# Patient Record
Sex: FEMALE | Race: WHITE | NOT HISPANIC OR LATINO | Employment: OTHER | URBAN - NONMETROPOLITAN AREA
[De-identification: names, ages, dates, MRNs, and addresses within clinical notes are randomized per-mention and may not be internally consistent; named-entity substitution may affect disease eponyms.]

---

## 2021-02-05 ENCOUNTER — HOSPITAL ENCOUNTER (OUTPATIENT)
Facility: HOSPITAL | Age: 76
Setting detail: OBSERVATION
Discharge: HOME | End: 2021-02-07
Attending: EMERGENCY MEDICINE | Admitting: HOSPITALIST

## 2021-02-05 ENCOUNTER — APPOINTMENT (OUTPATIENT)
Dept: CT IMAGING | Facility: HOSPITAL | Age: 76
End: 2021-02-05

## 2021-02-05 ENCOUNTER — APPOINTMENT (OUTPATIENT)
Dept: GENERAL RADIOLOGY | Facility: HOSPITAL | Age: 76
End: 2021-02-05

## 2021-02-05 DIAGNOSIS — R42 VERTIGO: Primary | ICD-10-CM

## 2021-02-05 LAB
ALBUMIN SERPL-MCNC: 3.9 G/DL (ref 3.5–5.2)
ALBUMIN/GLOB SERPL: 1.3 G/DL
ALP SERPL-CCNC: 70 U/L (ref 39–117)
ALT SERPL W P-5'-P-CCNC: 10 U/L (ref 1–33)
ANION GAP SERPL CALCULATED.3IONS-SCNC: 13 MMOL/L (ref 5–15)
APTT PPP: 24.4 SECONDS (ref 20–40.3)
AST SERPL-CCNC: 14 U/L (ref 1–32)
BASOPHILS # BLD AUTO: 0.1 10*3/MM3 (ref 0–0.2)
BASOPHILS NFR BLD AUTO: 0.9 % (ref 0–1.5)
BILIRUB SERPL-MCNC: 0.6 MG/DL (ref 0–1.2)
BUN SERPL-MCNC: 12 MG/DL (ref 8–23)
BUN/CREAT SERPL: 13.8 (ref 7–25)
CALCIUM SPEC-SCNC: 9.4 MG/DL (ref 8.6–10.5)
CHLORIDE SERPL-SCNC: 98 MMOL/L (ref 98–107)
CO2 SERPL-SCNC: 23 MMOL/L (ref 22–29)
CREAT SERPL-MCNC: 0.87 MG/DL (ref 0.57–1)
DEPRECATED RDW RBC AUTO: 42.5 FL (ref 37–54)
EOSINOPHIL # BLD AUTO: 0.16 10*3/MM3 (ref 0–0.4)
EOSINOPHIL NFR BLD AUTO: 1.5 % (ref 0.3–6.2)
ERYTHROCYTE [DISTWIDTH] IN BLOOD BY AUTOMATED COUNT: 13.8 % (ref 12.3–15.4)
FLUAV RNA RESP QL NAA+PROBE: NOT DETECTED
FLUBV RNA RESP QL NAA+PROBE: NOT DETECTED
GFR SERPL CREATININE-BSD FRML MDRD: 63 ML/MIN/1.73
GLOBULIN UR ELPH-MCNC: 3.1 GM/DL
GLUCOSE SERPL-MCNC: 136 MG/DL (ref 65–99)
HCT VFR BLD AUTO: 38.4 % (ref 34–46.6)
HGB BLD-MCNC: 13.8 G/DL (ref 12–15.9)
HOLD SPECIMEN: NORMAL
HOLD SPECIMEN: NORMAL
IMM GRANULOCYTES # BLD AUTO: 0.03 10*3/MM3 (ref 0–0.05)
IMM GRANULOCYTES NFR BLD AUTO: 0.3 % (ref 0–0.5)
INR PPP: 0.92 (ref 0.8–1.2)
LYMPHOCYTES # BLD AUTO: 4.4 10*3/MM3 (ref 0.7–3.1)
LYMPHOCYTES NFR BLD AUTO: 40.2 % (ref 19.6–45.3)
MAGNESIUM SERPL-MCNC: 2 MG/DL (ref 1.6–2.4)
MCH RBC QN AUTO: 30.8 PG (ref 26.6–33)
MCHC RBC AUTO-ENTMCNC: 35.9 G/DL (ref 31.5–35.7)
MCV RBC AUTO: 85.7 FL (ref 79–97)
MONOCYTES # BLD AUTO: 0.73 10*3/MM3 (ref 0.1–0.9)
MONOCYTES NFR BLD AUTO: 6.7 % (ref 5–12)
NEUTROPHILS NFR BLD AUTO: 5.53 10*3/MM3 (ref 1.7–7)
NEUTROPHILS NFR BLD AUTO: 50.4 % (ref 42.7–76)
NRBC BLD AUTO-RTO: 0 /100 WBC (ref 0–0.2)
PLATELET # BLD AUTO: 234 10*3/MM3 (ref 140–450)
PMV BLD AUTO: 10.5 FL (ref 6–12)
POTASSIUM SERPL-SCNC: 3.1 MMOL/L (ref 3.5–5.2)
PROT SERPL-MCNC: 7 G/DL (ref 6–8.5)
PROTHROMBIN TIME: 12.7 SECONDS (ref 11.1–15.3)
RBC # BLD AUTO: 4.48 10*6/MM3 (ref 3.77–5.28)
SARS-COV-2 RNA RESP QL NAA+PROBE: NOT DETECTED
SODIUM SERPL-SCNC: 134 MMOL/L (ref 136–145)
TROPONIN T SERPL-MCNC: <0.01 NG/ML (ref 0–0.03)
TROPONIN T SERPL-MCNC: <0.01 NG/ML (ref 0–0.03)
WBC # BLD AUTO: 10.95 10*3/MM3 (ref 3.4–10.8)
WHOLE BLOOD HOLD SPECIMEN: NORMAL
WHOLE BLOOD HOLD SPECIMEN: NORMAL

## 2021-02-05 PROCEDURE — 96361 HYDRATE IV INFUSION ADD-ON: CPT

## 2021-02-05 PROCEDURE — 25010000002 ONDANSETRON PER 1 MG: Performed by: EMERGENCY MEDICINE

## 2021-02-05 PROCEDURE — 70498 CT ANGIOGRAPHY NECK: CPT

## 2021-02-05 PROCEDURE — 85730 THROMBOPLASTIN TIME PARTIAL: CPT | Performed by: EMERGENCY MEDICINE

## 2021-02-05 PROCEDURE — 25010000002 LORAZEPAM PER 2 MG: Performed by: EMERGENCY MEDICINE

## 2021-02-05 PROCEDURE — 85610 PROTHROMBIN TIME: CPT | Performed by: EMERGENCY MEDICINE

## 2021-02-05 PROCEDURE — G0378 HOSPITAL OBSERVATION PER HR: HCPCS

## 2021-02-05 PROCEDURE — 80053 COMPREHEN METABOLIC PANEL: CPT | Performed by: EMERGENCY MEDICINE

## 2021-02-05 PROCEDURE — 93010 ELECTROCARDIOGRAM REPORT: CPT | Performed by: INTERNAL MEDICINE

## 2021-02-05 PROCEDURE — 96375 TX/PRO/DX INJ NEW DRUG ADDON: CPT

## 2021-02-05 PROCEDURE — 70496 CT ANGIOGRAPHY HEAD: CPT

## 2021-02-05 PROCEDURE — 99285 EMERGENCY DEPT VISIT HI MDM: CPT

## 2021-02-05 PROCEDURE — 71045 X-RAY EXAM CHEST 1 VIEW: CPT

## 2021-02-05 PROCEDURE — 85025 COMPLETE CBC W/AUTO DIFF WBC: CPT

## 2021-02-05 PROCEDURE — 96374 THER/PROPH/DIAG INJ IV PUSH: CPT

## 2021-02-05 PROCEDURE — C9803 HOPD COVID-19 SPEC COLLECT: HCPCS

## 2021-02-05 PROCEDURE — 0 IOPAMIDOL PER 1 ML: Performed by: EMERGENCY MEDICINE

## 2021-02-05 PROCEDURE — 93005 ELECTROCARDIOGRAM TRACING: CPT | Performed by: EMERGENCY MEDICINE

## 2021-02-05 PROCEDURE — 83735 ASSAY OF MAGNESIUM: CPT | Performed by: EMERGENCY MEDICINE

## 2021-02-05 PROCEDURE — 84484 ASSAY OF TROPONIN QUANT: CPT | Performed by: EMERGENCY MEDICINE

## 2021-02-05 PROCEDURE — 87636 SARSCOV2 & INF A&B AMP PRB: CPT | Performed by: EMERGENCY MEDICINE

## 2021-02-05 PROCEDURE — 70450 CT HEAD/BRAIN W/O DYE: CPT

## 2021-02-05 RX ORDER — ASPIRIN 325 MG
325 TABLET, DELAYED RELEASE (ENTERIC COATED) ORAL ONCE
Status: COMPLETED | OUTPATIENT
Start: 2021-02-05 | End: 2021-02-05

## 2021-02-05 RX ORDER — LORAZEPAM 2 MG/ML
0.5 INJECTION INTRAMUSCULAR ONCE
Status: COMPLETED | OUTPATIENT
Start: 2021-02-05 | End: 2021-02-05

## 2021-02-05 RX ORDER — SODIUM CHLORIDE 9 MG/ML
125 INJECTION, SOLUTION INTRAVENOUS CONTINUOUS
Status: DISCONTINUED | OUTPATIENT
Start: 2021-02-05 | End: 2021-02-06

## 2021-02-05 RX ORDER — SODIUM CHLORIDE 0.9 % (FLUSH) 0.9 %
10 SYRINGE (ML) INJECTION AS NEEDED
Status: DISCONTINUED | OUTPATIENT
Start: 2021-02-05 | End: 2021-02-07 | Stop reason: HOSPADM

## 2021-02-05 RX ORDER — POTASSIUM CHLORIDE 750 MG/1
40 CAPSULE, EXTENDED RELEASE ORAL ONCE
Status: COMPLETED | OUTPATIENT
Start: 2021-02-05 | End: 2021-02-05

## 2021-02-05 RX ORDER — ONDANSETRON 2 MG/ML
4 INJECTION INTRAMUSCULAR; INTRAVENOUS ONCE
Status: COMPLETED | OUTPATIENT
Start: 2021-02-05 | End: 2021-02-05

## 2021-02-05 RX ORDER — MECLIZINE HYDROCHLORIDE 25 MG/1
25 TABLET ORAL ONCE
Status: COMPLETED | OUTPATIENT
Start: 2021-02-05 | End: 2021-02-05

## 2021-02-05 RX ADMIN — ONDANSETRON 4 MG: 2 INJECTION INTRAMUSCULAR; INTRAVENOUS at 18:06

## 2021-02-05 RX ADMIN — LORAZEPAM 0.5 MG: 2 INJECTION INTRAMUSCULAR; INTRAVENOUS at 18:06

## 2021-02-05 RX ADMIN — MECLIZINE HYDROCHLORIDE 25 MG: 25 TABLET ORAL at 22:58

## 2021-02-05 RX ADMIN — SODIUM CHLORIDE 250 ML: 9 INJECTION, SOLUTION INTRAVENOUS at 18:06

## 2021-02-05 RX ADMIN — IOPAMIDOL 90 ML: 755 INJECTION, SOLUTION INTRAVENOUS at 18:37

## 2021-02-05 RX ADMIN — SODIUM CHLORIDE 125 ML/HR: 900 INJECTION, SOLUTION INTRAVENOUS at 18:07

## 2021-02-05 RX ADMIN — ASPIRIN 325 MG: 325 TABLET, COATED ORAL at 22:58

## 2021-02-05 RX ADMIN — POTASSIUM CHLORIDE 40 MEQ: 10 CAPSULE, COATED, EXTENDED RELEASE ORAL at 22:58

## 2021-02-06 ENCOUNTER — APPOINTMENT (OUTPATIENT)
Dept: MRI IMAGING | Facility: HOSPITAL | Age: 76
End: 2021-02-06

## 2021-02-06 LAB
ANION GAP SERPL CALCULATED.3IONS-SCNC: 7 MMOL/L (ref 5–15)
BASOPHILS # BLD AUTO: 0.05 10*3/MM3 (ref 0–0.2)
BASOPHILS NFR BLD AUTO: 0.6 % (ref 0–1.5)
BUN SERPL-MCNC: 9 MG/DL (ref 8–23)
BUN/CREAT SERPL: 10.2 (ref 7–25)
CALCIUM SPEC-SCNC: 8.9 MG/DL (ref 8.6–10.5)
CHLORIDE SERPL-SCNC: 102 MMOL/L (ref 98–107)
CO2 SERPL-SCNC: 29 MMOL/L (ref 22–29)
CREAT SERPL-MCNC: 0.88 MG/DL (ref 0.57–1)
DEPRECATED RDW RBC AUTO: 44.9 FL (ref 37–54)
EOSINOPHIL # BLD AUTO: 0.05 10*3/MM3 (ref 0–0.4)
EOSINOPHIL NFR BLD AUTO: 0.6 % (ref 0.3–6.2)
ERYTHROCYTE [DISTWIDTH] IN BLOOD BY AUTOMATED COUNT: 13.7 % (ref 12.3–15.4)
GFR SERPL CREATININE-BSD FRML MDRD: 62 ML/MIN/1.73
GLUCOSE SERPL-MCNC: 92 MG/DL (ref 65–99)
HCT VFR BLD AUTO: 37.8 % (ref 34–46.6)
HGB BLD-MCNC: 12.8 G/DL (ref 12–15.9)
IMM GRANULOCYTES # BLD AUTO: 0.01 10*3/MM3 (ref 0–0.05)
IMM GRANULOCYTES NFR BLD AUTO: 0.1 % (ref 0–0.5)
LYMPHOCYTES # BLD AUTO: 2.39 10*3/MM3 (ref 0.7–3.1)
LYMPHOCYTES NFR BLD AUTO: 27.3 % (ref 19.6–45.3)
MAGNESIUM SERPL-MCNC: 2.2 MG/DL (ref 1.6–2.4)
MCH RBC QN AUTO: 30.5 PG (ref 26.6–33)
MCHC RBC AUTO-ENTMCNC: 33.9 G/DL (ref 31.5–35.7)
MCV RBC AUTO: 90 FL (ref 79–97)
MONOCYTES # BLD AUTO: 0.5 10*3/MM3 (ref 0.1–0.9)
MONOCYTES NFR BLD AUTO: 5.7 % (ref 5–12)
NEUTROPHILS NFR BLD AUTO: 5.75 10*3/MM3 (ref 1.7–7)
NEUTROPHILS NFR BLD AUTO: 65.7 % (ref 42.7–76)
NRBC BLD AUTO-RTO: 0 /100 WBC (ref 0–0.2)
PLATELET # BLD AUTO: 197 10*3/MM3 (ref 140–450)
PMV BLD AUTO: 10.7 FL (ref 6–12)
POTASSIUM SERPL-SCNC: 4 MMOL/L (ref 3.5–5.2)
RBC # BLD AUTO: 4.2 10*6/MM3 (ref 3.77–5.28)
SODIUM SERPL-SCNC: 138 MMOL/L (ref 136–145)
WBC # BLD AUTO: 8.75 10*3/MM3 (ref 3.4–10.8)

## 2021-02-06 PROCEDURE — G0378 HOSPITAL OBSERVATION PER HR: HCPCS

## 2021-02-06 PROCEDURE — 99205 OFFICE O/P NEW HI 60 MIN: CPT | Performed by: PSYCHIATRY & NEUROLOGY

## 2021-02-06 PROCEDURE — 70551 MRI BRAIN STEM W/O DYE: CPT

## 2021-02-06 PROCEDURE — 85025 COMPLETE CBC W/AUTO DIFF WBC: CPT | Performed by: FAMILY MEDICINE

## 2021-02-06 PROCEDURE — 80048 BASIC METABOLIC PNL TOTAL CA: CPT | Performed by: FAMILY MEDICINE

## 2021-02-06 PROCEDURE — 83735 ASSAY OF MAGNESIUM: CPT | Performed by: FAMILY MEDICINE

## 2021-02-06 RX ORDER — CHOLECALCIFEROL (VITAMIN D3) 10 MCG
50 CAPSULE ORAL DAILY
COMMUNITY

## 2021-02-06 RX ORDER — POTASSIUM CHLORIDE 750 MG/1
10 TABLET, FILM COATED, EXTENDED RELEASE ORAL 2 TIMES DAILY
COMMUNITY

## 2021-02-06 RX ORDER — LOVASTATIN 10 MG/1
10 TABLET ORAL NIGHTLY
COMMUNITY

## 2021-02-06 RX ORDER — SODIUM CHLORIDE 9 MG/ML
50 INJECTION, SOLUTION INTRAVENOUS CONTINUOUS
Status: DISCONTINUED | OUTPATIENT
Start: 2021-02-06 | End: 2021-02-06

## 2021-02-06 RX ORDER — LEVOTHYROXINE SODIUM 0.1 MG/1
100 TABLET ORAL DAILY
Status: DISCONTINUED | OUTPATIENT
Start: 2021-02-06 | End: 2021-02-07 | Stop reason: HOSPADM

## 2021-02-06 RX ORDER — HYDROCHLOROTHIAZIDE 25 MG/1
25 TABLET ORAL DAILY
Status: DISCONTINUED | OUTPATIENT
Start: 2021-02-06 | End: 2021-02-07 | Stop reason: HOSPADM

## 2021-02-06 RX ORDER — ONDANSETRON 2 MG/ML
4 INJECTION INTRAMUSCULAR; INTRAVENOUS EVERY 6 HOURS PRN
Status: DISCONTINUED | OUTPATIENT
Start: 2021-02-06 | End: 2021-02-07 | Stop reason: HOSPADM

## 2021-02-06 RX ORDER — SODIUM CHLORIDE 0.9 % (FLUSH) 0.9 %
10 SYRINGE (ML) INJECTION AS NEEDED
Status: DISCONTINUED | OUTPATIENT
Start: 2021-02-06 | End: 2021-02-07 | Stop reason: HOSPADM

## 2021-02-06 RX ORDER — CETIRIZINE HYDROCHLORIDE 10 MG/1
10 TABLET ORAL DAILY
COMMUNITY

## 2021-02-06 RX ORDER — HYDROCHLOROTHIAZIDE 25 MG/1
25 TABLET ORAL DAILY
COMMUNITY

## 2021-02-06 RX ORDER — PANTOPRAZOLE SODIUM 40 MG/1
40 TABLET, DELAYED RELEASE ORAL EVERY MORNING
Status: DISCONTINUED | OUTPATIENT
Start: 2021-02-06 | End: 2021-02-07 | Stop reason: HOSPADM

## 2021-02-06 RX ORDER — CETIRIZINE HYDROCHLORIDE 10 MG/1
10 TABLET ORAL DAILY
Status: DISCONTINUED | OUTPATIENT
Start: 2021-02-06 | End: 2021-02-07 | Stop reason: HOSPADM

## 2021-02-06 RX ORDER — SODIUM CHLORIDE 0.9 % (FLUSH) 0.9 %
10 SYRINGE (ML) INJECTION EVERY 12 HOURS SCHEDULED
Status: DISCONTINUED | OUTPATIENT
Start: 2021-02-06 | End: 2021-02-07 | Stop reason: HOSPADM

## 2021-02-06 RX ORDER — OMEPRAZOLE 20 MG/1
20 CAPSULE, DELAYED RELEASE ORAL DAILY
COMMUNITY

## 2021-02-06 RX ORDER — LEVOTHYROXINE SODIUM 0.1 MG/1
100 TABLET ORAL DAILY
COMMUNITY

## 2021-02-06 RX ORDER — ATORVASTATIN CALCIUM 20 MG/1
20 TABLET, FILM COATED ORAL DAILY
Status: DISCONTINUED | OUTPATIENT
Start: 2021-02-06 | End: 2021-02-07 | Stop reason: HOSPADM

## 2021-02-06 RX ADMIN — ATORVASTATIN CALCIUM 20 MG: 20 TABLET, FILM COATED ORAL at 08:43

## 2021-02-06 RX ADMIN — LEVOTHYROXINE SODIUM 100 MCG: 100 TABLET ORAL at 08:42

## 2021-02-06 RX ADMIN — SODIUM CHLORIDE, PRESERVATIVE FREE 10 ML: 5 INJECTION INTRAVENOUS at 20:29

## 2021-02-06 RX ADMIN — PANTOPRAZOLE SODIUM 40 MG: 40 TABLET, DELAYED RELEASE ORAL at 06:19

## 2021-02-06 RX ADMIN — CETIRIZINE HYDROCHLORIDE 10 MG: 10 TABLET, FILM COATED ORAL at 08:43

## 2021-02-06 RX ADMIN — SODIUM CHLORIDE, PRESERVATIVE FREE 10 ML: 5 INJECTION INTRAVENOUS at 09:21

## 2021-02-06 RX ADMIN — HYDROCHLOROTHIAZIDE 25 MG: 25 TABLET ORAL at 08:42

## 2021-02-06 NOTE — H&P
[facility] Medicine Admission      Date of Admission: 2/5/2021      Primary Care Physician: Jordyn Patel MD      Chief Complaint: Vertigo    HPI:    This is a 75-year-old female with concurrent medical history of hyperlipidemia, hypertension, history of tobacco use, GERD, hypothyroidism, seasonal allergies presenting to the hospital with complaint of having vertigo.  She reports that for the past few days the room has been spinning around and she has been having some increasing nausea vomiting associated with dizziness.  Reports that she has had similar episodes of dizziness in the past but never has been so severe and never has been accompanied with nausea vomiting.  She denies any chest pain or any shortness of air.  She denies any cough congestion sore throat or any fevers.    Past Medical History:  has no past medical history on file.  Hyperlipidemia, hypertension, GERD, hypothyroidism, seasonal allergies    Past Surgical History:  has no past surgical history on file.  None    Family History: family history is not on file.  Positive for hypertension    Social History:  reports that she quit smoking about 13 months ago. She started smoking about 59 years ago. She has never used smokeless tobacco.    Allergies:   Allergies   Allergen Reactions   • Codeine GI Intolerance       Medications: Scheduled Meds:atorvastatin, 20 mg, Oral, Daily  cetirizine, 10 mg, Oral, Daily  hydroCHLOROthiazide, 25 mg, Oral, Daily  levothyroxine, 100 mcg, Oral, Daily  pantoprazole, 40 mg, Oral, QAM  sodium chloride, 10 mL, Intravenous, Q12H      Continuous Infusions:sodium chloride, 125 mL/hr, Last Rate: 125 mL/hr (02/05/21 1807)  sodium chloride, 50 mL/hr      PRN Meds:.ondansetron  •  sodium chloride  •  sodium chloride  No current facility-administered medications on file prior to encounter.      Current Outpatient Medications on File Prior to Encounter   Medication Sig Dispense  Refill   • cetirizine (zyrTEC) 10 MG tablet Take 10 mg by mouth Daily.     • Cholecalciferol (EQL Vitamin D3) 50 MCG (2000 UT) capsule Take 50 Units by mouth Daily.     • hydroCHLOROthiazide (HYDRODIURIL) 25 MG tablet Take 25 mg by mouth Daily.     • levothyroxine (SYNTHROID, LEVOTHROID) 100 MCG tablet Take 100 mcg by mouth Daily.     • lovastatin (MEVACOR) 10 MG tablet Take 10 mg by mouth Every Night.     • omeprazole (priLOSEC) 20 MG capsule Take 20 mg by mouth Daily.     • potassium chloride 10 MEQ CR tablet Take 10 mEq by mouth 2 (Two) Times a Day.         Review of Systems:  Review of Systems   Constitutional: Negative for fever.   HENT: Negative for congestion.    Respiratory: Negative for shortness of breath.    Cardiovascular: Negative for chest pain.   Gastrointestinal: Negative for diarrhea.   Genitourinary: Negative for dysuria.      Otherwise complete ROS is negative except as mentioned above.    Physical Exam:   Temp:  [97.2 °F (36.2 °C)-97.8 °F (36.6 °C)] 97.2 °F (36.2 °C)  Heart Rate:  [64-68] 68  Resp:  [16-22] 22  BP: (132-162)/(68-75) 136/70  Physical Exam  Vitals signs and nursing note reviewed.   Constitutional:       General: She is not in acute distress.     Appearance: She is well-developed. She is not diaphoretic.   HENT:      Head: Normocephalic and atraumatic.   Cardiovascular:      Rate and Rhythm: Normal rate.   Pulmonary:      Effort: Pulmonary effort is normal. No respiratory distress.      Breath sounds: No wheezing.   Abdominal:      General: There is no distension.      Palpations: Abdomen is soft.   Musculoskeletal: Normal range of motion.   Skin:     General: Skin is warm and dry.   Neurological:      Mental Status: She is alert.      Cranial Nerves: No cranial nerve deficit.   Psychiatric:         Behavior: Behavior normal.         Thought Content: Thought content normal.         Judgment: Judgment normal.           Results Reviewed:  I have personally reviewed current lab,  radiology, and data and agree with results.  Lab Results (last 24 hours)     Procedure Component Value Units Date/Time    COVID-19 and FLU A/B PCR - Swab, Nasopharynx [[id number]]  (Normal) Collected: 02/05/21 2301    Specimen: Swab from Nasopharynx Updated: 02/05/21 2335     COVID19 Not Detected     Influenza A PCR Not Detected     Influenza B PCR Not Detected    Narrative:      Fact sheet for providers: https://www.fda.gov/media/243651/download    Fact sheet for patients: https://www.fda.gov/media/279075/download    Test performed by PCR.    Troponin [[id number]]  (Normal) Collected: 02/05/21 1913    Specimen: Blood Updated: 02/05/21 1938     Troponin T <0.010 ng/mL     Narrative:      Troponin T Reference Range:  <= 0.03 ng/mL-   Negative for AMI  >0.03 ng/mL-     Abnormal for myocardial necrosis.  Clinicians would have to utilize clinical acumen, EKG, Troponin and serial changes to determine if it is an Acute Myocardial Infarction or myocardial injury due to an underlying chronic condition.       Results may be falsely decreased if patient taking Biotin.      [place] Draw [[id number]] Collected: 02/05/21 1721    Specimen: Blood Updated: 02/05/21 1831    Narrative:      The following orders were created for panel order [place] Draw.  Procedure                               Abnormality         Status                     ---------                               -----------         ------                     Light Blue Top[[id number]]                                   Final result               Green Top (Gel)[[id number]]                                  Final result               Lavender Top[[id number]]                                     Final result               Gold Top - SST[[id number]]                                   Final result                 Please view results for these tests on the individual orders.    Light Blue Top [[id number]] Collected: 02/05/21 1721    Specimen: Blood Updated: 02/05/21 1831     Extra  Tube hold for add-on     Comment: Auto resulted       Green Top (Gel) [[id number]] Collected: 02/05/21 1721    Specimen: Blood Updated: 02/05/21 1831     Extra Tube Hold for add-ons.     Comment: Auto resulted.       Lavender Top [[id number]] Collected: 02/05/21 1721    Specimen: Blood Updated: 02/05/21 1831     Extra Tube hold for add-on     Comment: Auto resulted       Gold Top - SST [[id number]] Collected: 02/05/21 1721    Specimen: Blood Updated: 02/05/21 1831     Extra Tube Hold for add-ons.     Comment: Auto resulted.       aPTT [[id number]]  (Normal) Collected: 02/05/21 1721    Specimen: Blood Updated: 02/05/21 1804     PTT 24.4 seconds     Narrative:      The recommended Heparin therapeutic range is 68-97 seconds.    Protime-INR [[id number]]  (Normal) Collected: 02/05/21 1721    Specimen: Blood Updated: 02/05/21 1804     Protime 12.7 Seconds      INR 0.92    Narrative:      Therapeutic range for most indications is 2.0-3.0 INR,  or 2.5-3.5 for mechanical heart valves.    Comprehensive Metabolic Panel [[id number]]  (Abnormal) Collected: 02/05/21 1721    Specimen: Blood Updated: 02/05/21 1747     Glucose 136 mg/dL      BUN 12 mg/dL      Creatinine 0.87 mg/dL      Sodium 134 mmol/L      Potassium 3.1 mmol/L      Chloride 98 mmol/L      CO2 23.0 mmol/L      Calcium 9.4 mg/dL      Total Protein 7.0 g/dL      Albumin 3.90 g/dL      ALT (SGPT) 10 U/L      AST (SGOT) 14 U/L      Alkaline Phosphatase 70 U/L      Total Bilirubin 0.6 mg/dL      eGFR Non African Amer 63 mL/min/1.73      Globulin 3.1 gm/dL      A/G Ratio 1.3 g/dL      BUN/Creatinine Ratio 13.8     Anion Gap 13.0 mmol/L     Narrative:      GFR Normal >60  Chronic Kidney Disease <60  Kidney Failure <15      Magnesium [[id number]]  (Normal) Collected: 02/05/21 1721    Specimen: Blood Updated: 02/05/21 1747     Magnesium 2.0 mg/dL     Troponin [[id number]]  (Normal) Collected: 02/05/21 1721    Specimen: Blood Updated: 02/05/21 1747     Troponin T <0.010 ng/mL      Narrative:      Troponin T Reference Range:  <= 0.03 ng/mL-   Negative for AMI  >0.03 ng/mL-     Abnormal for myocardial necrosis.  Clinicians would have to utilize clinical acumen, EKG, Troponin and serial changes to determine if it is an Acute Myocardial Infarction or myocardial injury due to an underlying chronic condition.       Results may be falsely decreased if patient taking Biotin.      CBC & Differential [[id number]]  (Abnormal) Collected: 02/05/21 1721    Specimen: Blood Updated: 02/05/21 1729    Narrative:      The following orders were created for panel order CBC & Differential.  Procedure                               Abnormality         Status                     ---------                               -----------         ------                     CBC Auto Differential[[id number]]        Abnormal            Final result                 Please view results for these tests on the individual orders.    CBC Auto Differential [[id number]]  (Abnormal) Collected: 02/05/21 1721    Specimen: Blood Updated: 02/05/21 1729     WBC 10.95 10*3/mm3      RBC 4.48 10*6/mm3      Hemoglobin 13.8 g/dL      Hematocrit 38.4 %      MCV 85.7 fL      MCH 30.8 pg      MCHC 35.9 g/dL      RDW 13.8 %      RDW-SD 42.5 fl      MPV 10.5 fL      Platelets 234 10*3/mm3      Neutrophil % 50.4 %      Lymphocyte % 40.2 %      Monocyte % 6.7 %      Eosinophil % 1.5 %      Basophil % 0.9 %      Immature Grans % 0.3 %      Neutrophils, Absolute 5.53 10*3/mm3      Lymphocytes, Absolute 4.40 10*3/mm3      Monocytes, Absolute 0.73 10*3/mm3      Eosinophils, Absolute 0.16 10*3/mm3      Basophils, Absolute 0.10 10*3/mm3      Immature Grans, Absolute 0.03 10*3/mm3      nRBC 0.0 /100 WBC         Imaging Results (Last 24 Hours)     Procedure Component Value Units Date/Time    CT Angiogram Head [[id number]] Collected: 02/05/21 1822     Updated: 02/05/21 1948    Narrative:      Vertigo.    CT had and neck angiography with intravenous  contrast.    Radiation dose-limiting techniques also utilized, including  automated exposure control and adjustment of mA and/or KVP to the  patient size according to ALARA (as low as reasonably  achievable).    Isovue 370 90 mL injected IV. Computer generated 3-D  reconstruction and MIPS images were performed.    CT neck angiography demonstrates no evidence of stenosis,  occlusion or vascular malformation.    CT head angiography demonstrates no evidence of stenosis,  occlusion or vascular malformation. There are large bilateral  posterior communicating arteries.    No mass is identified in the nasopharynx, oropharynx. There is  soft tissue fullness in the region of true vocal cords. The  parotid, submandibular and thyroid glands are unremarkable. No  lymphadenopathy is identified. There are emphysematous changes in  scarlike densities seen in the lung apices. There are  degenerative changes of the cervical spine.      Impression:      CONCLUSION: Normal CT head and neck angiography.    Electronically signed by:  Allen Morrow MD  2/5/2021 7:46 PM  CST Workstation: [id number]    CT Head Without Contrast [[id number]] Collected: 02/05/21 1821     Updated: 02/05/21 1856    Narrative:        CT Head Without Contrast    History: Vertigo    Axial scans of the brain were obtained without intravenous  contrast.  Coronal and sagital reconstructions were preformed.    This exam was performed according to our departmental  dose-optimization program, which includes automated exposure  control, adjustment of the mA and/or kV according to patient size  and/or use of iterative reconstruction technique.    DLP: 876.70    Comparison: None    Findings:  Bone windows are unremarkable.  Mucosal thickening left maxillary sinus.    No acute process.  Minimal cerebral and cerebellar atrophy.  No hemorrhage.  No mass.  No abnormal areas of increased or decreased attenuation.  No midline shift.  No abnormal extra-axial fluid  collections.      Impression:      CONCLUSION:  No acute process.  Minimal cerebral and cerebellar atrophy.    [id number]    Electronically signed by:  Randal Bronson MD  2/5/2021 6:55 PM CST  Workstation: [employer]    CT Angiogram Carotids [[id number]] Resulted: 02/05/21 1836     Updated: 02/05/21 1838    XR Chest 1 View [[id number]] Collected: 02/05/21 1810     Updated: 02/05/21 1824    Narrative:        PORTABLE CHEST    HISTORY: Vomiting    Portable AP upright film of the chest was obtained at 5:48 PM.  COMPARISON: None    FINDINGS:   The lungs are clear of an acute process.  Old granulomatous disease is present.  Pleural thickening in the apices.  The heart is not enlarged.  The pulmonary vasculature is not increased.  No pleural effusion.  No pneumothorax.  No acute osseous abnormality.      Impression:      CONCLUSION:  No Acute Disease    [id number]    Electronically signed by:  Randal Bronson MD  2/5/2021 6:23 PM CST  Workstation: [employer]            Assessment:    Active Hospital Problems    Diagnosis   • Vertigo         Vertigo-we will continue to monitor in stepdown unit.  Neurology was consulted and they are recommending monitoring overnight and also to get MRI of the brain.  We will schedule for tomorrow morning.  We will continue to treat her symptoms.    Hypertension-resume home medications and continue to monitor    Hypothyroidism-continue with monitoring    GERD-continue with PPI    DVT prophylaxis-SCD                Kp Swan MD  02/06/21  01:13 CST

## 2021-02-06 NOTE — ED PROVIDER NOTES
Subjective   74yo female pmh significant htn/hyperlipidemia/hypothyroid/gerd presents ED via EMS c/o acute onset vertigo/nausea/vomiting.  ROS neg headache/diplopia/dysarthria/ dysphagia/chest pain/syncope/paresthesia/motor weakness.      History provided by:  Patient  Dizziness  Quality:  Vertigo and head spinning  Associated symptoms: nausea and vomiting    Associated symptoms: no headaches and no weakness        Review of Systems   Constitutional: Negative.    HENT: Negative.    Respiratory: Negative.    Cardiovascular: Negative.    Gastrointestinal: Positive for nausea and vomiting. Negative for abdominal pain.   Genitourinary: Negative.    Musculoskeletal: Negative.    Neurological: Positive for dizziness. Negative for syncope, facial asymmetry, weakness, numbness and headaches.   All other systems reviewed and are negative.      No past medical history on file.    Allergies   Allergen Reactions   • Codeine GI Intolerance       No past surgical history on file.    No family history on file.    Social History     Socioeconomic History   • Marital status: Single     Spouse name: Not on file   • Number of children: Not on file   • Years of education: Not on file   • Highest education level: Not on file           Objective   Physical Exam  Vitals signs and nursing note reviewed.   Constitutional:       Appearance: Normal appearance.   HENT:      Head: Normocephalic and atraumatic.      Right Ear: Tympanic membrane, ear canal and external ear normal.      Left Ear: Tympanic membrane, ear canal and external ear normal.      Nose: Nose normal.      Mouth/Throat:      Mouth: Mucous membranes are moist.   Eyes:      Extraocular Movements: Extraocular movements intact.      Pupils: Pupils are equal, round, and reactive to light.   Neck:      Musculoskeletal: Normal range of motion and neck supple. No neck rigidity.   Cardiovascular:      Rate and Rhythm: Normal rate and regular rhythm.      Pulses: Normal pulses.       Heart sounds: Normal heart sounds. No murmur. No friction rub. No gallop.    Pulmonary:      Effort: Pulmonary effort is normal. No respiratory distress.      Breath sounds: Normal breath sounds. No wheezing, rhonchi or rales.   Abdominal:      General: Abdomen is flat. Bowel sounds are normal. There is no distension.      Palpations: Abdomen is soft.      Tenderness: There is no abdominal tenderness. There is no guarding or rebound.   Musculoskeletal: Normal range of motion.   Lymphadenopathy:      Cervical: No cervical adenopathy.   Skin:     General: Skin is warm and dry.   Neurological:      General: No focal deficit present.      Mental Status: She is alert and oriented to person, place, and time.      GCS: GCS eye subscore is 4. GCS verbal subscore is 5. GCS motor subscore is 6.      Cranial Nerves: Cranial nerves are intact.      Sensory: Sensation is intact.      Motor: Motor function is intact.      Coordination: Coordination is intact. Coordination normal. Finger-Nose-Finger Test and Heel to Shin Test normal. Rapid alternating movements normal.      Gait: Gait is intact.      Comments: (+) horizontal nystagmus bilaterally  (+) fili hallpike maneuver  (-) HINTS           ECG 12 Lead      Date/Time: 2/5/2021 10:39 PM  Performed by: Negro Man MD  Authorized by: Negro Man MD   Interpreted by physician  Rhythm: sinus rhythm  Rate: normal  BPM: 61  QRS axis: left  Conduction: 1st degree  ST Segments: ST segments normal  T Waves: T waves normal  Other: no other findings  Clinical impression: abnormal ECG                 ED Course      Labs Reviewed   COMPREHENSIVE METABOLIC PANEL - Abnormal; Notable for the following components:       Result Value    Glucose 136 (*)     Sodium 134 (*)     Potassium 3.1 (*)     All other components within normal limits    Narrative:     GFR Normal >60  Chronic Kidney Disease <60  Kidney Failure <15     CBC WITH AUTO DIFFERENTIAL - Abnormal; Notable for the following  components:    WBC 10.95 (*)     MCHC 35.9 (*)     Lymphocytes, Absolute 4.40 (*)     All other components within normal limits   MAGNESIUM - Normal   TROPONIN (IN-HOUSE) - Normal    Narrative:     Troponin T Reference Range:  <= 0.03 ng/mL-   Negative for AMI  >0.03 ng/mL-     Abnormal for myocardial necrosis.  Clinicians would have to utilize clinical acumen, EKG, Troponin and serial changes to determine if it is an Acute Myocardial Infarction or myocardial injury due to an underlying chronic condition.       Results may be falsely decreased if patient taking Biotin.     APTT - Normal    Narrative:     The recommended Heparin therapeutic range is 68-97 seconds.   PROTIME-INR - Normal    Narrative:     Therapeutic range for most indications is 2.0-3.0 INR,  or 2.5-3.5 for mechanical heart valves.   TROPONIN (IN-HOUSE) - Normal    Narrative:     Troponin T Reference Range:  <= 0.03 ng/mL-   Negative for AMI  >0.03 ng/mL-     Abnormal for myocardial necrosis.  Clinicians would have to utilize clinical acumen, EKG, Troponin and serial changes to determine if it is an Acute Myocardial Infarction or myocardial injury due to an underlying chronic condition.       Results may be falsely decreased if patient taking Biotin.     RAINBOW DRAW    Narrative:     The following orders were created for panel order [place] Draw.  Procedure                               Abnormality         Status                     ---------                               -----------         ------                     Light Blue Top[[id number]]                                   Final result               Green Top (Gel)[[id number]]                                  Final result               Lavender Top[[id number]]                                     Final result               Gold Top - SST[[id number]]                                   Final result                 Please view results for these tests on the individual orders.   TROPONIN (IN-HOUSE)   CBC  AND DIFFERENTIAL    Narrative:     The following orders were created for panel order CBC & Differential.  Procedure                               Abnormality         Status                     ---------                               -----------         ------                     CBC Auto Differential[[id number]]        Abnormal            Final result                 Please view results for these tests on the individual orders.   LIGHT BLUE TOP   GREEN TOP   LAVENDER TOP   GOLD TOP - SST     Ct Head Without Contrast    Result Date: 2/5/2021  Narrative: CT Head Without Contrast History: Vertigo Axial scans of the brain were obtained without intravenous contrast.  Coronal and sagital reconstructions were preformed. This exam was performed according to our departmental dose-optimization program, which includes automated exposure control, adjustment of the mA and/or kV according to patient size and/or use of iterative reconstruction technique. DLP: 876.70 Comparison: None Findings: Bone windows are unremarkable. Mucosal thickening left maxillary sinus. No acute process. Minimal cerebral and cerebellar atrophy. No hemorrhage. No mass. No abnormal areas of increased or decreased attenuation. No midline shift. No abnormal extra-axial fluid collections.     Impression: CONCLUSION: No acute process. Minimal cerebral and cerebellar atrophy. [id number] Electronically signed by:  Randal Bronson MD  2/5/2021 6:55 PM CST Workstation: [id number] Chest 1 View    Result Date: 2/5/2021  Narrative: PORTABLE CHEST HISTORY: Vomiting Portable AP upright film of the chest was obtained at 5:48 PM. COMPARISON: None FINDINGS: The lungs are clear of an acute process. Old granulomatous disease is present. Pleural thickening in the apices. The heart is not enlarged. The pulmonary vasculature is not increased. No pleural effusion. No pneumothorax. No acute osseous abnormality.     Impression: CONCLUSION: No Acute Disease [id number] Electronically  signed by:  Randal Bronson MD  2/5/2021 6:23 PM CST Workstation: [id number]    Ct Angiogram Head    Result Date: 2/5/2021  Narrative: Vertigo. CT had and neck angiography with intravenous contrast. Radiation dose-limiting techniques also utilized, including automated exposure control and adjustment of mA and/or KVP to the patient size according to ALARA (as low as reasonably achievable). Isovue 370 90 mL injected IV. Computer generated 3-D reconstruction and MIPS images were performed. CT neck angiography demonstrates no evidence of stenosis, occlusion or vascular malformation. CT head angiography demonstrates no evidence of stenosis, occlusion or vascular malformation. There are large bilateral posterior communicating arteries. No mass is identified in the nasopharynx, oropharynx. There is soft tissue fullness in the region of true vocal cords. The parotid, submandibular and thyroid glands are unremarkable. No lymphadenopathy is identified. There are emphysematous changes in scarlike densities seen in the lung apices. There are degenerative changes of the cervical spine.     Impression: CONCLUSION: Normal CT head and neck angiography. Electronically signed by:  Allen Morrow MD  2/5/2021 7:46 PM CST Workstation: [id number]                                         MDM  Number of Diagnoses or Management Options  Vertigo:   Diagnosis management comments: Labs/CT head/CTA head, neck reviewed without abnormality. nonfocal neuro exam c/w peripheral vertigo/BPPV.  D/w Dr. Mora, teleneurology. Recommends 23 observation/meclizine/asa/MRI brain without contrast.       Amount and/or Complexity of Data Reviewed  Clinical lab tests: reviewed  Tests in the radiology section of CPT®: reviewed  Tests in the medicine section of CPT®: reviewed        Final diagnoses:   Vertigo            Negro Man MD  02/05/21 6010

## 2021-02-06 NOTE — CONSULTS
[place]:  76 yo F with several days of vertigo provoked by head movements.  Patient notes previous occurrences of vertigo with spontaneous resolution, occurring repeatedly over a 3 year period.  Typically symptoms are mild and treatable with Dramamine.    ROS current:  no headache, neck pain, hearing loss, double vision, vision loss, slurred speech, difficulty using language, difficulty swallowing, focal weakness / numbness / clumsiness, chest pain / pressure, SOB, palpitations  + vomiting  + tingling over whole body, feeling cold  + tinnitus, ? ear    MEDHX:   dyslipidemia, HTN, GERD, hypothyroidism  MEDS home:  atorvastatin, cetirizine, HCTZ, Synthroid, pantoprazole  ALL:  codeine  SOCHX:  quit T, no E, no D  FAMHX:  HTN    PE:  168/84, 70 SR, 98% RA 18RR, afebrile  Awake, alert, attentive, NAD;  comprehends following Vox3, produces normally and appropriately, no dysarthria  Fields full to monocular testing, EOMI, no N, gaze conjugate and crossing midline  Pupils 3mm reactive  Face symmetric, tongue midline, shrug 5/5  Strength 5/5 throughout   Sensation intact B L F-A-L  No jerking, tremor, dyscoordination  Stance stable, gait narrow and stable      LABS:  CT head negative, CTA head & neck negative    ASSESSMENT:  76 yo F with isolated vertigo, no evidence of a causative brain injury    PLAN:  1) Normalize blood pressure now  2) PT to train patient on Epley maneuver, which should be performed bilaterally each hour when symptomatic and daily when patient is asymptomatic  3) PRN meclizine until symptomatically improved  4) Recommend home BP monitoring QID, with PRN hydralazine for SBP > 160  5) Audiology evaluation as outpatient, referring to ENT if abnormalities identified on testing  6) Call with questions [phone]

## 2021-02-07 VITALS
OXYGEN SATURATION: 95 % | DIASTOLIC BLOOD PRESSURE: 79 MMHG | SYSTOLIC BLOOD PRESSURE: 144 MMHG | RESPIRATION RATE: 16 BRPM | HEART RATE: 75 BPM | TEMPERATURE: 97.8 F | WEIGHT: 163.3 LBS | BODY MASS INDEX: 26.24 KG/M2 | HEIGHT: 66 IN

## 2021-02-07 LAB
ANION GAP SERPL CALCULATED.3IONS-SCNC: 9 MMOL/L (ref 5–15)
BASOPHILS # BLD AUTO: 0.06 10*3/MM3 (ref 0–0.2)
BASOPHILS NFR BLD AUTO: 0.9 % (ref 0–1.5)
BUN SERPL-MCNC: 13 MG/DL (ref 8–23)
BUN/CREAT SERPL: 14.1 (ref 7–25)
CALCIUM SPEC-SCNC: 9 MG/DL (ref 8.6–10.5)
CHLORIDE SERPL-SCNC: 102 MMOL/L (ref 98–107)
CO2 SERPL-SCNC: 28 MMOL/L (ref 22–29)
CREAT SERPL-MCNC: 0.92 MG/DL (ref 0.57–1)
DEPRECATED RDW RBC AUTO: 44.8 FL (ref 37–54)
EOSINOPHIL # BLD AUTO: 0.17 10*3/MM3 (ref 0–0.4)
EOSINOPHIL NFR BLD AUTO: 2.4 % (ref 0.3–6.2)
ERYTHROCYTE [DISTWIDTH] IN BLOOD BY AUTOMATED COUNT: 13.8 % (ref 12.3–15.4)
GFR SERPL CREATININE-BSD FRML MDRD: 59 ML/MIN/1.73
GLUCOSE SERPL-MCNC: 83 MG/DL (ref 65–99)
HCT VFR BLD AUTO: 39.5 % (ref 34–46.6)
HGB BLD-MCNC: 13.4 G/DL (ref 12–15.9)
IMM GRANULOCYTES # BLD AUTO: 0.01 10*3/MM3 (ref 0–0.05)
IMM GRANULOCYTES NFR BLD AUTO: 0.1 % (ref 0–0.5)
LYMPHOCYTES # BLD AUTO: 2.37 10*3/MM3 (ref 0.7–3.1)
LYMPHOCYTES NFR BLD AUTO: 33.6 % (ref 19.6–45.3)
MAGNESIUM SERPL-MCNC: 2 MG/DL (ref 1.6–2.4)
MCH RBC QN AUTO: 30.3 PG (ref 26.6–33)
MCHC RBC AUTO-ENTMCNC: 33.9 G/DL (ref 31.5–35.7)
MCV RBC AUTO: 89.4 FL (ref 79–97)
MONOCYTES # BLD AUTO: 0.51 10*3/MM3 (ref 0.1–0.9)
MONOCYTES NFR BLD AUTO: 7.2 % (ref 5–12)
NEUTROPHILS NFR BLD AUTO: 3.93 10*3/MM3 (ref 1.7–7)
NEUTROPHILS NFR BLD AUTO: 55.8 % (ref 42.7–76)
NRBC BLD AUTO-RTO: 0 /100 WBC (ref 0–0.2)
PLATELET # BLD AUTO: 188 10*3/MM3 (ref 140–450)
PMV BLD AUTO: 10.8 FL (ref 6–12)
POTASSIUM SERPL-SCNC: 3.7 MMOL/L (ref 3.5–5.2)
RBC # BLD AUTO: 4.42 10*6/MM3 (ref 3.77–5.28)
SODIUM SERPL-SCNC: 139 MMOL/L (ref 136–145)
WBC # BLD AUTO: 7.05 10*3/MM3 (ref 3.4–10.8)

## 2021-02-07 PROCEDURE — 85025 COMPLETE CBC W/AUTO DIFF WBC: CPT | Performed by: FAMILY MEDICINE

## 2021-02-07 PROCEDURE — 80048 BASIC METABOLIC PNL TOTAL CA: CPT | Performed by: FAMILY MEDICINE

## 2021-02-07 PROCEDURE — 99214 OFFICE O/P EST MOD 30 MIN: CPT | Performed by: PSYCHIATRY & NEUROLOGY

## 2021-02-07 PROCEDURE — 96376 TX/PRO/DX INJ SAME DRUG ADON: CPT

## 2021-02-07 PROCEDURE — 83735 ASSAY OF MAGNESIUM: CPT | Performed by: FAMILY MEDICINE

## 2021-02-07 PROCEDURE — 25010000002 ONDANSETRON PER 1 MG: Performed by: FAMILY MEDICINE

## 2021-02-07 PROCEDURE — G0378 HOSPITAL OBSERVATION PER HR: HCPCS

## 2021-02-07 RX ORDER — ONDANSETRON 4 MG/1
4 TABLET, ORALLY DISINTEGRATING ORAL EVERY 8 HOURS PRN
Qty: 30 TABLET | Refills: 0 | Status: SHIPPED | OUTPATIENT
Start: 2021-02-07

## 2021-02-07 RX ORDER — MECLIZINE HYDROCHLORIDE 25 MG/1
25 TABLET ORAL 3 TIMES DAILY PRN
Qty: 60 TABLET | Refills: 0 | Status: SHIPPED | OUTPATIENT
Start: 2021-02-07

## 2021-02-07 RX ADMIN — CETIRIZINE HYDROCHLORIDE 10 MG: 10 TABLET, FILM COATED ORAL at 07:49

## 2021-02-07 RX ADMIN — ONDANSETRON 4 MG: 2 INJECTION INTRAMUSCULAR; INTRAVENOUS at 10:36

## 2021-02-07 RX ADMIN — ATORVASTATIN CALCIUM 20 MG: 20 TABLET, FILM COATED ORAL at 07:48

## 2021-02-07 RX ADMIN — PANTOPRAZOLE SODIUM 40 MG: 40 TABLET, DELAYED RELEASE ORAL at 07:49

## 2021-02-07 RX ADMIN — LEVOTHYROXINE SODIUM 100 MCG: 100 TABLET ORAL at 07:48

## 2021-02-07 RX ADMIN — SODIUM CHLORIDE, PRESERVATIVE FREE 10 ML: 5 INJECTION INTRAVENOUS at 07:49

## 2021-02-07 RX ADMIN — HYDROCHLOROTHIAZIDE 25 MG: 25 TABLET ORAL at 07:48

## 2021-02-07 NOTE — PROGRESS NOTES
SUBJECTIVE:  patient reports fewer symptoms, but still triggerable by head movements     OBJECTIVE:  158/77, otherwise VS WNL  Awake, alert, attentive, NAD;  comprehends following Vox3, produces normally and appropriately, no dysarthria  EOMI, no N, gaze conjugate and crossing midline  Face symmetric, tongue midline, shrug 5/5  Strength 5/5 throughout   No jerking, tremor, dyscoordination  Sits stably, stance stable, gait narrow and stable     ASSESSMENT:  74 yo F with isolated vertigo, no evidence of a causative brain injury     PLAN:  1. PT to train patient on Epley maneuver, which should be performed bilaterally each hour when symptomatic and daily when patient is asymptomatic  2. PRN meclizine until symptomatically improved  3. Target BP < 140/90  4. Recommend home BP monitoring QID, with PRN hydralazine for SBP > 160  5. Audiology evaluation as outpatient, referring to ENT if abnormalities identified on testing  6. Recommend DC home today  7. Call with questions [phone]

## 2021-02-07 NOTE — PROGRESS NOTES
[facility]  INPATIENT PROGRESS NOTE    Length of Stay: 0  Date of Admission: 2/5/2021  Primary Care Physician: Jordyn Patel MD    Subjective   Chief Complaint: Dizziness  HPI: Patient states she is feeling better today.  Less dizziness.  She did have an episode after MRI.    Review of Systems   Constitutional: Negative for appetite change, chills, fatigue, fever and unexpected weight change.   Respiratory: Negative for cough, choking, chest tightness, shortness of breath and wheezing.    Cardiovascular: Negative for chest pain, palpitations and leg swelling.   Gastrointestinal: Negative for abdominal pain, blood in stool, constipation, diarrhea, nausea and vomiting.   Genitourinary: Negative for dysuria, flank pain and hematuria.   Neurological: Positive for dizziness. Negative for seizures, syncope, speech difficulty, weakness, light-headedness, numbness and headaches.   Hematological: Does not bruise/bleed easily.        All pertinent negatives and positives are as above. All other systems have been reviewed and are negative unless otherwise stated.     Objective    Temp:  [97.2 °F (36.2 °C)-98.8 °F (37.1 °C)] 98.8 °F (37.1 °C)  Heart Rate:  [53-72] 63  Resp:  [18-22] 18  BP: (117-168)/(68-84) 142/78    Physical Exam  Vitals signs reviewed.   Constitutional:       Appearance: She is well-developed.   HENT:      Head: Normocephalic and atraumatic.   Eyes:      Pupils: Pupils are equal, round, and reactive to light.   Neck:      Musculoskeletal: Normal range of motion and neck supple.   Cardiovascular:      Rate and Rhythm: Normal rate and regular rhythm.      Heart sounds: Normal heart sounds. No murmur. No friction rub. No gallop.    Pulmonary:      Effort: Pulmonary effort is normal. No respiratory distress.      Breath sounds: Normal breath sounds. No wheezing or rales.   Chest:      Chest wall: No tenderness.   Abdominal:      General: Bowel  sounds are normal. There is no distension.      Palpations: Abdomen is soft.      Tenderness: There is no abdominal tenderness. There is no guarding.   Skin:     General: Skin is warm and dry.   Psychiatric:         Behavior: Behavior normal.         Thought Content: Thought content normal.       Results Review:  I have reviewed the labs, radiology results, and diagnostic studies.    Laboratory Data:   Results from last 7 days   Lab Units 02/06/21  0537 02/05/21  1721   SODIUM mmol/L 138 134*   POTASSIUM mmol/L 4.0 3.1*   CHLORIDE mmol/L 102 98   CO2 mmol/L 29.0 23.0   BUN mg/dL 9 12   CREATININE mg/dL 0.88 0.87   GLUCOSE mg/dL 92 136*   CALCIUM mg/dL 8.9 9.4   BILIRUBIN mg/dL  --  0.6   ALK PHOS U/L  --  70   ALT (SGPT) U/L  --  10   AST (SGOT) U/L  --  14   ANION GAP mmol/L 7.0 13.0     Estimated Creatinine Clearance: 55.4 mL/min (by C-G formula based on SCr of 0.88 mg/dL).  Results from last 7 days   Lab Units 02/06/21  0537 02/05/21  1721   MAGNESIUM mg/dL 2.2 2.0         Results from last 7 days   Lab Units 02/06/21  0537 02/05/21  1721   WBC 10*3/mm3 8.75 10.95*   HEMOGLOBIN g/dL 12.8 13.8   HEMATOCRIT % 37.8 38.4   PLATELETS 10*3/mm3 197 234     Results from last 7 days   Lab Units 02/05/21  1721   INR  0.92       Culture Data:   No results found for: BLOODCX  No results found for: URINECX  No results found for: RESPCX  No results found for: WOUNDCX  No results found for: STOOLCX  No components found for: BODYFLD    Radiology Data:   Imaging Results (Last 24 Hours)     Procedure Component Value Units Date/Time    MRI Brain Without Contrast [[id number]] Collected: 02/06/21 0945     Updated: 02/06/21 1052    Narrative:      MRI brain without contrast.       CLINICAL INDICATION: Vertigo, dizziness.          COMPARISON: CT brain February 5, 2021.     PROCEDURE/TECHNIQUE:    MRI of the brain was performed utilizing the standard protocol  without the administration of gadolinium.    FINDINGS: Diffusion-weighted  images demonstrate no restricted  diffusion. No evidence of acute stroke. There are a few scattered  periventricular foci of increased signal intensity, chronic small  vessel ischemic changes.    The gyri and sulci are bilaterally symmetric consistent with  patient's age.  No mass lesions, hydrocephalus, midline shift,  intracranial hemorrhage, or abnormal intra- or extra-axial fluid  collections are identified.  The brainstem and sellar and  parasellar structures are without abnormalities.  The orbits,  sinuses and mastoid air cells are unremarkable.  The visualized  intracranial vessels show normal signal void.      Impression:      There are a few scattered periventricular foci of  increased signal intensity, chronic small vessel ischemic  changes. Otherwise unremarkable MRI of the brain without  contrast. There are no acute changes.    Electronically signed by:  Chad Nuñez MD  2/6/2021 10:51 AM CST  Workstation: [id number]    CT Angiogram Head [[id number]] Collected: 02/05/21 1822     Updated: 02/05/21 1948    Narrative:      Vertigo.    CT had and neck angiography with intravenous contrast.    Radiation dose-limiting techniques also utilized, including  automated exposure control and adjustment of mA and/or KVP to the  patient size according to ALARA (as low as reasonably  achievable).    Isovue 370 90 mL injected IV. Computer generated 3-D  reconstruction and MIPS images were performed.    CT neck angiography demonstrates no evidence of stenosis,  occlusion or vascular malformation.    CT head angiography demonstrates no evidence of stenosis,  occlusion or vascular malformation. There are large bilateral  posterior communicating arteries.    No mass is identified in the nasopharynx, oropharynx. There is  soft tissue fullness in the region of true vocal cords. The  parotid, submandibular and thyroid glands are unremarkable. No  lymphadenopathy is identified. There are emphysematous changes in  scarlike densities  seen in the lung apices. There are  degenerative changes of the cervical spine.      Impression:      CONCLUSION: Normal CT head and neck angiography.    Electronically signed by:  Allen Morrow MD  2/5/2021 7:46 PM  CST Workstation: [id number]          I have reviewed the patient's current medications.     Assessment/Plan     Active Hospital Problems    Diagnosis   • Vertigo       Plan:    1.  Vertigo: Neurology ruled out stroke.  Physical therapy consulted for Epley maneuver.  We will continue meclizine as needed.  2.  Hypertension: Continue to adjust antihypertensive for maximal control.  3.  Gastroesophageal reflux disease: Continue PPI  4.  DVT prophylaxis: SCDs.        The patient was evaluated during the global COVID-19 pandemic, and the diagnosis was suspected/considered upon their initial presentation.  Evaluation, treatment, and testing were consistent with current guidelines for patients who present with complaints or symptoms that may be related to COVID-19.    Discharge Planning: I expect patient to be discharged to home in 1-2 days.        This document has been electronically signed by Jan Nieves MD on February 6, 2021 19:42 CST

## 2021-02-07 NOTE — DISCHARGE SUMMARY
[facility] Medicine Services  DISCHARGE SUMMARY       Date of Admission: 2/5/2021  Date of Discharge:  2/7/2021  Primary Care Physician: Jordyn Patel MD    Presenting Problem/History of Present Illness:  Vertigo [R42]       Final Discharge Diagnoses:  Active Hospital Problems    Diagnosis   • Vertigo       Consults:   Consults     Date and Time Order Name Status Description    2/5/2021 2236 Inpatient Neurology Consult General Completed     2/5/2021 2236 Hospitalist (on-call MD unless specified)          Pertinent Test Results:   Lab Results (most recent)     Procedure Component Value Units Date/Time    Basic Metabolic Panel [[id number]]  (Abnormal) Collected: 02/07/21 0629    Specimen: Blood Updated: 02/07/21 0718     Glucose 83 mg/dL      BUN 13 mg/dL      Creatinine 0.92 mg/dL      Sodium 139 mmol/L      Potassium 3.7 mmol/L      Chloride 102 mmol/L      CO2 28.0 mmol/L      Calcium 9.0 mg/dL      eGFR Non African Amer 59 mL/min/1.73      BUN/Creatinine Ratio 14.1     Anion Gap 9.0 mmol/L     Narrative:      GFR Normal >60  Chronic Kidney Disease <60  Kidney Failure <15      Magnesium [[id number]]  (Normal) Collected: 02/07/21 0629    Specimen: Blood Updated: 02/07/21 0718     Magnesium 2.0 mg/dL     CBC & Differential [[id number]]  (Normal) Collected: 02/07/21 0629    Specimen: Blood Updated: 02/07/21 0701    Narrative:      The following orders were created for panel order CBC & Differential.  Procedure                               Abnormality         Status                     ---------                               -----------         ------                     CBC Auto Differential[[id number]]        Normal              Final result                 Please view results for these tests on the individual orders.    CBC Auto Differential [[id number]]  (Normal) Collected: 02/07/21 0629    Specimen: Blood Updated: 02/07/21 0701     WBC 7.05 10*3/mm3      RBC 4.42  10*6/mm3      Hemoglobin 13.4 g/dL      Hematocrit 39.5 %      MCV 89.4 fL      MCH 30.3 pg      MCHC 33.9 g/dL      RDW 13.8 %      RDW-SD 44.8 fl      MPV 10.8 fL      Platelets 188 10*3/mm3      Neutrophil % 55.8 %      Lymphocyte % 33.6 %      Monocyte % 7.2 %      Eosinophil % 2.4 %      Basophil % 0.9 %      Immature Grans % 0.1 %      Neutrophils, Absolute 3.93 10*3/mm3      Lymphocytes, Absolute 2.37 10*3/mm3      Monocytes, Absolute 0.51 10*3/mm3      Eosinophils, Absolute 0.17 10*3/mm3      Basophils, Absolute 0.06 10*3/mm3      Immature Grans, Absolute 0.01 10*3/mm3      nRBC 0.0 /100 WBC     Basic Metabolic Panel [[id number]]  (Normal) Collected: 02/06/21 0537    Specimen: Blood Updated: 02/06/21 0610     Glucose 92 mg/dL      BUN 9 mg/dL      Creatinine 0.88 mg/dL      Sodium 138 mmol/L      Potassium 4.0 mmol/L      Chloride 102 mmol/L      CO2 29.0 mmol/L      Calcium 8.9 mg/dL      eGFR Non African Amer 62 mL/min/1.73      BUN/Creatinine Ratio 10.2     Anion Gap 7.0 mmol/L     Narrative:      GFR Normal >60  Chronic Kidney Disease <60  Kidney Failure <15      Magnesium [[id number]]  (Normal) Collected: 02/06/21 0537    Specimen: Blood Updated: 02/06/21 0607     Magnesium 2.2 mg/dL     CBC & Differential [[id number]]  (Normal) Collected: 02/06/21 0537    Specimen: Blood Updated: 02/06/21 0554    Narrative:      The following orders were created for panel order CBC & Differential.  Procedure                               Abnormality         Status                     ---------                               -----------         ------                     CBC Auto Differential[[id number]]        Normal              Final result                 Please view results for these tests on the individual orders.    CBC Auto Differential [[id number]]  (Normal) Collected: 02/06/21 0537    Specimen: Blood Updated: 02/06/21 0554     WBC 8.75 10*3/mm3      RBC 4.20 10*6/mm3      Hemoglobin 12.8 g/dL      Hematocrit 37.8 %       MCV 90.0 fL      MCH 30.5 pg      MCHC 33.9 g/dL      RDW 13.7 %      RDW-SD 44.9 fl      MPV 10.7 fL      Platelets 197 10*3/mm3      Neutrophil % 65.7 %      Lymphocyte % 27.3 %      Monocyte % 5.7 %      Eosinophil % 0.6 %      Basophil % 0.6 %      Immature Grans % 0.1 %      Neutrophils, Absolute 5.75 10*3/mm3      Lymphocytes, Absolute 2.39 10*3/mm3      Monocytes, Absolute 0.50 10*3/mm3      Eosinophils, Absolute 0.05 10*3/mm3      Basophils, Absolute 0.05 10*3/mm3      Immature Grans, Absolute 0.01 10*3/mm3      nRBC 0.0 /100 WBC     COVID-19 and FLU A/B PCR - Swab, Nasopharynx [[id number]]  (Normal) Collected: 02/05/21 2301    Specimen: Swab from Nasopharynx Updated: 02/05/21 2335     COVID19 Not Detected     Influenza A PCR Not Detected     Influenza B PCR Not Detected    Narrative:      Fact sheet for providers: https://www.fda.gov/media/804161/download    Fact sheet for patients: https://www.fda.gov/media/717886/download    Test performed by PCR.    Troponin [[id number]]  (Normal) Collected: 02/05/21 1913    Specimen: Blood Updated: 02/05/21 1938     Troponin T <0.010 ng/mL     Narrative:      Troponin T Reference Range:  <= 0.03 ng/mL-   Negative for AMI  >0.03 ng/mL-     Abnormal for myocardial necrosis.  Clinicians would have to utilize clinical acumen, EKG, Troponin and serial changes to determine if it is an Acute Myocardial Infarction or myocardial injury due to an underlying chronic condition.       Results may be falsely decreased if patient taking Biotin.      I[place]quois Draw [[id number]] Collected: 02/05/21 1721    Specimen: Blood Updated: 02/05/21 1831    Narrative:      The following orders were created for panel order [place]quois Draw.  Procedure                               Abnormality         Status                     ---------                               -----------         ------                     Light Blue Top[[id number]]                                   Final result                  Green Top (Gel)[[id number]]                                  Final result               Lavender Top[[id number]]                                     Final result               Gold Top - SST[[id number]]                                   Final result                 Please view results for these tests on the individual orders.    Light Blue Top [[id number]] Collected: 02/05/21 1721    Specimen: Blood Updated: 02/05/21 1831     Extra Tube hold for add-on     Comment: Auto resulted       Green Top (Gel) [[id number]] Collected: 02/05/21 1721    Specimen: Blood Updated: 02/05/21 1831     Extra Tube Hold for add-ons.     Comment: Auto resulted.       Lavender Top [[id number]] Collected: 02/05/21 1721    Specimen: Blood Updated: 02/05/21 1831     Extra Tube hold for add-on     Comment: Auto resulted       Gold Top - SST [[id number]] Collected: 02/05/21 1721    Specimen: Blood Updated: 02/05/21 1831     Extra Tube Hold for add-ons.     Comment: Auto resulted.       aPTT [[id number]]  (Normal) Collected: 02/05/21 1721    Specimen: Blood Updated: 02/05/21 1804     PTT 24.4 seconds     Narrative:      The recommended Heparin therapeutic range is 68-97 seconds.    Protime-INR [[id number]]  (Normal) Collected: 02/05/21 1721    Specimen: Blood Updated: 02/05/21 1804     Protime 12.7 Seconds      INR 0.92    Narrative:      Therapeutic range for most indications is 2.0-3.0 INR,  or 2.5-3.5 for mechanical heart valves.    Comprehensive Metabolic Panel [[id number]]  (Abnormal) Collected: 02/05/21 1721    Specimen: Blood Updated: 02/05/21 1747     Glucose 136 mg/dL      BUN 12 mg/dL      Creatinine 0.87 mg/dL      Sodium 134 mmol/L      Potassium 3.1 mmol/L      Chloride 98 mmol/L      CO2 23.0 mmol/L      Calcium 9.4 mg/dL      Total Protein 7.0 g/dL      Albumin 3.90 g/dL      ALT (SGPT) 10 U/L      AST (SGOT) 14 U/L      Alkaline Phosphatase 70 U/L      Total Bilirubin 0.6 mg/dL      eGFR Non African Amer 63 mL/min/1.73      Globulin 3.1  gm/dL      A/G Ratio 1.3 g/dL      BUN/Creatinine Ratio 13.8     Anion Gap 13.0 mmol/L     Narrative:      GFR Normal >60  Chronic Kidney Disease <60  Kidney Failure <15      Troponin [[id number]]  (Normal) Collected: 02/05/21 1721    Specimen: Blood Updated: 02/05/21 1747     Troponin T <0.010 ng/mL     Narrative:      Troponin T Reference Range:  <= 0.03 ng/mL-   Negative for AMI  >0.03 ng/mL-     Abnormal for myocardial necrosis.  Clinicians would have to utilize clinical acumen, EKG, Troponin and serial changes to determine if it is an Acute Myocardial Infarction or myocardial injury due to an underlying chronic condition.       Results may be falsely decreased if patient taking Biotin.          Imaging Results (Most Recent)     Procedure Component Value Units Date/Time    MRI Brain Without Contrast [[id number]] Collected: 02/06/21 0945     Updated: 02/06/21 1052    Narrative:      MRI brain without contrast.       CLINICAL INDICATION: Vertigo, dizziness.          COMPARISON: CT brain February 5, 2021.     PROCEDURE/TECHNIQUE:    MRI of the brain was performed utilizing the standard protocol  without the administration of gadolinium.    FINDINGS: Diffusion-weighted images demonstrate no restricted  diffusion. No evidence of acute stroke. There are a few scattered  periventricular foci of increased signal intensity, chronic small  vessel ischemic changes.    The gyri and sulci are bilaterally symmetric consistent with  patient's age.  No mass lesions, hydrocephalus, midline shift,  intracranial hemorrhage, or abnormal intra- or extra-axial fluid  collections are identified.  The brainstem and sellar and  parasellar structures are without abnormalities.  The orbits,  sinuses and mastoid air cells are unremarkable.  The visualized  intracranial vessels show normal signal void.      Impression:      There are a few scattered periventricular foci of  increased signal intensity, chronic small vessel ischemic  changes.  Otherwise unremarkable MRI of the brain without  contrast. There are no acute changes.    Electronically signed by:  Chad Nuñez MD  2/6/2021 10:51 AM CST  Workstation: [id number]    CT Angiogram Head [[id number]] Collected: 02/05/21 1822     Updated: 02/05/21 1948    Narrative:      Vertigo.    CT had and neck angiography with intravenous contrast.    Radiation dose-limiting techniques also utilized, including  automated exposure control and adjustment of mA and/or KVP to the  patient size according to ALARA (as low as reasonably  achievable).    Isovue 370 90 mL injected IV. Computer generated 3-D  reconstruction and MIPS images were performed.    CT neck angiography demonstrates no evidence of stenosis,  occlusion or vascular malformation.    CT head angiography demonstrates no evidence of stenosis,  occlusion or vascular malformation. There are large bilateral  posterior communicating arteries.    No mass is identified in the nasopharynx, oropharynx. There is  soft tissue fullness in the region of true vocal cords. The  parotid, submandibular and thyroid glands are unremarkable. No  lymphadenopathy is identified. There are emphysematous changes in  scarlike densities seen in the lung apices. There are  degenerative changes of the cervical spine.      Impression:      CONCLUSION: Normal CT head and neck angiography.    Electronically signed by:  Allen Morrow MD  2/5/2021 7:46 PM  CST Workstation: [id number]    CT Head Without Contrast [[id number]] Collected: 02/05/21 1821     Updated: 02/05/21 1856    Narrative:        CT Head Without Contrast    History: Vertigo    Axial scans of the brain were obtained without intravenous  contrast.  Coronal and sagital reconstructions were preformed.    This exam was performed according to our departmental  dose-optimization program, which includes automated exposure  control, adjustment of the mA and/or kV according to patient size  and/or use of iterative reconstruction  technique.    DLP: 876.70    Comparison: None    Findings:  Bone windows are unremarkable.  Mucosal thickening left maxillary sinus.    No acute process.  Minimal cerebral and cerebellar atrophy.  No hemorrhage.  No mass.  No abnormal areas of increased or decreased attenuation.  No midline shift.  No abnormal extra-axial fluid collections.      Impression:      CONCLUSION:  No acute process.  Minimal cerebral and cerebellar atrophy.    [id number]    Electronically signed by:  Randal Bronson MD  2/5/2021 6:55 PM CST  Workstation: [id number]    CT Angiogram Carotids [[id number]] Resulted: 02/05/21 1836     Updated: 02/05/21 1838    XR Chest 1 View [[id number]] Collected: 02/05/21 1810     Updated: 02/05/21 1824    Narrative:        PORTABLE CHEST    HISTORY: Vomiting    Portable AP upright film of the chest was obtained at 5:48 PM.  COMPARISON: None    FINDINGS:   The lungs are clear of an acute process.  Old granulomatous disease is present.  Pleural thickening in the apices.  The heart is not enlarged.  The pulmonary vasculature is not increased.  No pleural effusion.  No pneumothorax.  No acute osseous abnormality.      Impression:      CONCLUSION:  No Acute Disease    [id number]    Electronically signed by:  Randal Bronson MD  2/5/2021 6:23 PM CST  Workstation: [id number]          Chief Complaint on Day of Discharge: Mild, intermittent nausea    Hospital Course:  The patient is a 75 y.o. female who presented to [facility] with vertigo.  Neurology was consulted.  Patient was fully worked up for stroke and was negative.  Patient was managed for vertigo and improved significantly.  Symptoms resolved and she was discharged home.  Plan will be for patient to follow-up with primary care in 1 to 2 weeks.  She will follow-up with outpatient physical therapy for training and Epley maneuver.  Inpatient physical therapy was unable to perform this training.  She will follow-up with audiology in a week or 2 and ENT  "if she has abnormalities.  She will be discharged home with as needed meclizine and Zofran.    Condition on Discharge:  Stable    Physical Exam on Discharge:  /79   Pulse 75   Temp 97.8 °F (36.6 °C) (Temporal)   Resp 16   Ht 167.6 cm (66\")   Wt 74.1 kg (163 lb 4.8 oz)   SpO2 95%   BMI 26.37 kg/m²      Physical Exam  Vitals signs reviewed.   Constitutional:       Appearance: She is well-developed.   HENT:      Head: Normocephalic and atraumatic.   Eyes:      Pupils: Pupils are equal, round, and reactive to light.   Neck:      Musculoskeletal: Normal range of motion and neck supple.   Cardiovascular:      Rate and Rhythm: Normal rate and regular rhythm.      Heart sounds: Normal heart sounds. No murmur. No friction rub. No gallop.    Pulmonary:      Effort: Pulmonary effort is normal. No respiratory distress.      Breath sounds: Normal breath sounds. No wheezing or rales.   Chest:      Chest wall: No tenderness.   Abdominal:      General: Bowel sounds are normal. There is no distension.      Palpations: Abdomen is soft.      Tenderness: There is no abdominal tenderness. There is no guarding.   Skin:     General: Skin is warm and dry.   Psychiatric:         Behavior: Behavior normal.         Thought Content: Thought content normal.           Discharge Disposition:  Home or Self Care    Discharge Medications:     Discharge Medications      New Medications      Instructions Start Date   meclizine 25 MG tablet  Commonly known as: ANTIVERT   25 mg, Oral, 3 Times Daily PRN      ondansetron ODT 4 MG disintegrating tablet  Commonly known as: Zofran ODT   4 mg, Translingual, Every 8 Hours PRN         Continue These Medications      Instructions Start Date   cetirizine 10 MG tablet  Commonly known as: zyrTEC   10 mg, Oral, Daily      EQL Vitamin D3 50 MCG (2000 UT) capsule  Generic drug: Cholecalciferol   50 Units, Oral, Daily      hydroCHLOROthiazide 25 MG tablet  Commonly known as: HYDRODIURIL   25 mg, Oral, " Daily      levothyroxine 100 MCG tablet  Commonly known as: SYNTHROID, LEVOTHROID   100 mcg, Oral, Daily      lovastatin 10 MG tablet  Commonly known as: MEVACOR   10 mg, Oral, Nightly      omeprazole 20 MG capsule  Commonly known as: priLOSEC   20 mg, Oral, Daily      potassium chloride 10 MEQ CR tablet   10 mEq, Oral, 2 Times Daily             Discharge Diet:   Diet Instructions     Advance Diet As Tolerated            Activity at Discharge:   Activity Instructions     Activity as Tolerated            Follow-up Appointments:   PCP 1-2 weeks  Outpatient PT for Epley maneuver  Audiology 1-2 weeks        This document has been electronically signed by Jan Nieves MD on February 7, 2021 13:45 CST      Time: 35 min

## 2021-02-07 NOTE — SIGNIFICANT NOTE
02/07/21 1054   OTHER   Discipline physical therapist   Rehab Time/Intention   Session Not Performed other (see comments)   Recommendation   PT - Next Appointment 02/08/21     PT skilled in Epley Maneuver available tomorrow morning.     If Patient discharges home today, please order O/P PT for Epley maneuver training.

## 2021-02-11 LAB
QT INTERVAL: 466 MS
QTC INTERVAL: 469 MS

## 2021-02-17 ENCOUNTER — APPOINTMENT (OUTPATIENT)
Dept: PHYSICAL THERAPY | Facility: HOSPITAL | Age: 76
End: 2021-02-17

## 2021-04-07 NOTE — PLAN OF CARE
Goal Outcome Evaluation:         Pt has been pleasant and alert and oriented through the night. Sleeping at this time. Has not experienced any vertigo since arrival. VSS at this time.    96